# Patient Record
Sex: FEMALE | Race: WHITE | Employment: FULL TIME | URBAN - METROPOLITAN AREA
[De-identification: names, ages, dates, MRNs, and addresses within clinical notes are randomized per-mention and may not be internally consistent; named-entity substitution may affect disease eponyms.]

---

## 2019-03-04 ENCOUNTER — OFFICE VISIT (OUTPATIENT)
Dept: OBGYN | Facility: OTHER | Age: 19
End: 2019-03-04

## 2019-03-04 VITALS — SYSTOLIC BLOOD PRESSURE: 112 MMHG | WEIGHT: 151.5 LBS | DIASTOLIC BLOOD PRESSURE: 70 MMHG | HEART RATE: 80 BPM

## 2019-03-04 DIAGNOSIS — N94.2 VAGINISMUS: Primary | ICD-10-CM

## 2019-03-04 PROCEDURE — 99203 OFFICE O/P NEW LOW 30 MIN: CPT | Performed by: OBSTETRICS & GYNECOLOGY

## 2019-03-04 RX ORDER — LIDOCAINE HYDROCHLORIDE 40 MG/ML
SOLUTION TOPICAL PRN
Qty: 50 ML | Refills: 0 | Status: SHIPPED | OUTPATIENT
Start: 2019-03-04

## 2019-03-04 RX ORDER — LIDOCAINE HYDROCHLORIDE 40 MG/ML
SOLUTION TOPICAL DAILY
Qty: 50 ML | Refills: 0 | Status: SHIPPED | OUTPATIENT
Start: 2019-03-04 | End: 2019-03-04

## 2019-03-04 NOTE — PATIENT INSTRUCTIONS
If you have any questions regarding your visit, Please contact your care team.    Women [facility] CLINIC HOURS TELEPHONE NUMBER   Laquita Cabrera M.D.    Keira Garcias -         Monday-[facility]  7:00 am - 5 pm Monday's Tuesday- [facility]  8:00am- 5 pm  Wednesday- Off  Thursday- Offf Friday-Am [place], and [facility]  [place] 8:00-11:30 AM  [place] 1:00-5:00 PM [facility]  [address] [place] [address]  [phone] ask for [facility]    Imaging [phone]    [facility]  [address]  [place], [place] [address] [phone]  Imaging [phone]    [facility]  [address] [place], [place] [id number] [phone]  Imaging Scheduling - [phone]     Urgent Care locations:    [facility] Saturday and Sunday   9 am - 5 pm    Monday-Friday   12 pm - 8 pm  Saturday and Sunday   9 am - 5 pm   [phone]       If you need a medication refill, please contact your pharmacy. Please allow 3 business days for your refill to be completed.  As always, Thank you for trusting us with your healthcare needs!

## 2019-03-04 NOTE — PROGRESS NOTES
Subjective  18 year old non-pregnant female presents today complaining of vaginal pain.  Patient states she has tried to have intercourse but it is too painful.  Her partner has never been able to penetrate.  She has pain with fingers as well.  She has not been able to use tampons.  Her menses are regular, monthly.  Her last menstrual period was 2/17.  She changes her pad rarely.          ROS: 10 point ROS neg other than the symptoms noted above in the HPI.  History reviewed. No pertinent past medical history.  History reviewed. No pertinent surgical history.  History reviewed. No pertinent family history.  Social History     Tobacco Use     Smoking status: Never Smoker     Smokeless tobacco: Never Used   Substance Use Topics     Alcohol use: Yes         Objective  Vitals: /70   Pulse 80   Wt 68.7 kg (151 lb 8 oz)   LMP 02/17/2019   BMI= There is no height or weight on file to calculate BMI.    General appearance=well developed, well-nourished female  Psych=mood is stable  PELVIC:  Patient very uncomfortable with exam  External genitalia: normal without lesions or masses  Urethral meatus: no lesions or prolapse noted, normal size  Urethra: no masses, non tender  Bladder: non tender, no fullness  Vagina: normal mucosa and rugae, no discharge.  Cervix: unable to palpate due to pain  Uterus: unable to palpate due to pain  Adnexa: unable to palpate due to pain  Rectal: deffered      Assessment  1.)  Vaginismus      Plan  1.)  Physical therapy referral placed  2.)  Lidocaine ordered      25 minutes was spent face to face with the patient today discussing her history, diagnosis, and follow-up plan as noted above.  Over 50% of the visit was spent in counseling and coordination of care.      Nursing notes read and reviewed    Laquita Cabrera

## 2020-03-17 ENCOUNTER — TELEPHONE (OUTPATIENT)
Dept: FAMILY MEDICINE | Facility: CLINIC | Age: 20
End: 2020-03-17

## 2020-03-17 ENCOUNTER — VIRTUAL VISIT (OUTPATIENT)
Dept: FAMILY MEDICINE | Facility: CLINIC | Age: 20
End: 2020-03-17
Payer: COMMERCIAL

## 2020-03-17 DIAGNOSIS — F41.1 GAD (GENERALIZED ANXIETY DISORDER): Primary | ICD-10-CM

## 2020-03-17 PROCEDURE — 99203 OFFICE O/P NEW LOW 30 MIN: CPT | Mod: TEL | Performed by: FAMILY MEDICINE

## 2020-03-17 RX ORDER — LEVONORGESTREL/ETHIN.ESTRADIOL 0.1-0.02MG
1 TABLET ORAL DAILY
COMMUNITY

## 2020-03-17 RX ORDER — CLONAZEPAM 0.5 MG/1
0.5 TABLET, ORALLY DISINTEGRATING ORAL 2 TIMES DAILY PRN
Qty: 10 TABLET | Refills: 0 | Status: SHIPPED | OUTPATIENT
Start: 2020-03-17 | End: 2021-08-24

## 2020-03-17 RX ORDER — CLONAZEPAM 0.5 MG/1
0.5 TABLET, ORALLY DISINTEGRATING ORAL 2 TIMES DAILY PRN
Qty: 10 TABLET | Refills: 0 | Status: SHIPPED | OUTPATIENT
Start: 2020-03-17 | End: 2020-03-17

## 2020-03-17 NOTE — TELEPHONE ENCOUNTER
Please advise patient that those prescriptions were sent to [facility].  Please call [facility] and [facility] and cancel previous orders.    Pako Frazier MD, FAAFP  Family Medicine Physician  [facility]  [address] [place] [address]

## 2020-03-17 NOTE — TELEPHONE ENCOUNTER
Patient calling to request that the [facility] be resent to [facility] instead due to insurance.  Thanks

## 2020-03-17 NOTE — PROGRESS NOTES
"Sánchez Farias is a 19 year old female who is being evaluated via a billable telephone visit.      The patient has been notified of following:     \"This telephone visit will be conducted via a call between you and your physician/provider. We have found that certain health care needs can be provided without the need for a physical exam.  This service lets us provide the care you need with a short phone conversation.  If a prescription is necessary we can send it directly to your pharmacy.  If lab work is needed we can place an order for that and you can then stop by our lab to have the test done at a later time.    If during the course of the call the physician/provider feels a telephone visit is not appropriate, you will not be charged for this service.\"       Sánchez Farias complains of    Chief Complaint   Patient presents with     Anxiety       I have reviewed and updated the patient's Past Medical History, Social History, Family History and Medication List.    ALLERGIES  Gluten meal    Catia Romeo MA   (MA signature)    Additional provider notes: Anxiety for past several months, worsening since Nov 2019, improved during winter break.  Internship, [employer].  She has occasional panic attacks, stressful internship.  No suicidal or homicidal ideation.    History reviewed. No pertinent past medical history.      Assessment/Plan:  (F41.1) WAYNE (generalized anxiety disorder)  (primary encounter diagnosis)  Comment: We discussed current treatment options to include therapy and medication.  Patient would like medication at this time.  We discussed tapering up on Zoloft.  Limited use of clonazepam, only for panic attacks in the short-term.  Follow-up with me in 2 weeks.  Sooner if any worsening of mood.  Plan: sertraline (ZOLOFT) 50 MG tablet, clonazePAM         (KLONOPIN) 0.5 MG ODT            [facility] is currently attempting to limit face to face encounters to help reduce the spread of " COVID-19 within our community.  This information was presented to the patient. The patient's record was reviewed by a provider and the patient was offered a phone visit, rescheduled appointment after July 6, 2020, or if necessary, maintain the original face to face appointment, if applicable.  The patient opted for a telephone visit.      In an ideal situation, a face to face examination would offer more information regarding the patient's current condition.  However, given the current public health threat of COVID-19, a telephone visit offers a safer alternative to a face to face visit.  The patient was advised to follow up virtually or in clinic if no improvement or any worsening.      Pako Frazier MD, [facility]  Family Medicine Physician  [facility]  [id number] [place] [id number]          I have reviewed the note as documented above.  This accurately captures the substance of my conversation with the patient.      Phone call contact time  Call Started at 0906  Call Ended at 0920    Pako Frazier MD

## 2020-03-30 ENCOUNTER — VIRTUAL VISIT (OUTPATIENT)
Dept: FAMILY MEDICINE | Facility: CLINIC | Age: 20
End: 2020-03-30
Payer: COMMERCIAL

## 2020-03-30 DIAGNOSIS — F41.1 GAD (GENERALIZED ANXIETY DISORDER): Primary | ICD-10-CM

## 2020-03-30 PROCEDURE — 99213 OFFICE O/P EST LOW 20 MIN: CPT | Mod: TEL | Performed by: FAMILY MEDICINE

## 2020-03-30 ASSESSMENT — ANXIETY QUESTIONNAIRES
7. FEELING AFRAID AS IF SOMETHING AWFUL MIGHT HAPPEN: NOT AT ALL
3. WORRYING TOO MUCH ABOUT DIFFERENT THINGS: NOT AT ALL
6. BECOMING EASILY ANNOYED OR IRRITABLE: NOT AT ALL
5. BEING SO RESTLESS THAT IT IS HARD TO SIT STILL: NOT AT ALL
IF YOU CHECKED OFF ANY PROBLEMS ON THIS QUESTIONNAIRE, HOW DIFFICULT HAVE THESE PROBLEMS MADE IT FOR YOU TO DO YOUR WORK, TAKE CARE OF THINGS AT HOME, OR GET ALONG WITH OTHER PEOPLE: NOT DIFFICULT AT ALL
2. NOT BEING ABLE TO STOP OR CONTROL WORRYING: NEARLY EVERY DAY
GAD7 TOTAL SCORE: 6
1. FEELING NERVOUS, ANXIOUS, OR ON EDGE: MORE THAN HALF THE DAYS

## 2020-03-30 ASSESSMENT — PATIENT HEALTH QUESTIONNAIRE - PHQ9
5. POOR APPETITE OR OVEREATING: SEVERAL DAYS
SUM OF ALL RESPONSES TO PHQ QUESTIONS 1-9: 5

## 2020-03-30 NOTE — PROGRESS NOTES
"Subjective     Sánchez Farias is a 19 year old female who is being evaluated via a billable telephone visit.      The patient has been notified of following:     \"This telephone visit will be conducted via a call between you and your physician/provider. We have found that certain health care needs can be provided without the need for a physical exam.  This service lets us provide the care you need with a short phone conversation.  If a prescription is necessary we can send it directly to your pharmacy.  If lab work is needed we can place an order for that and you can then stop by our lab to have the test done at a later time.    If during the course of the call the physician/provider feels a telephone visit is not appropriate, you will not be charged for this service.\"     Physician has received verbal consent for a Telephone Visit from the patient? Yes    Sánchez Farias complains of   Chief Complaint   Patient presents with     Anxiety       ALLERGIES  Gluten meal    Along with generalized anxiety, was started on sertraline 2 weeks ago.  She has noticed a significant decrease in her anxiety, has only required 1 dose of Klonopin.  No adverse effects.    Patient Active Problem List   Diagnosis     Vaginismus     History reviewed. No pertinent surgical history.    Social History     Tobacco Use     Smoking status: Never Smoker     Smokeless tobacco: Never Used   Substance Use Topics     Alcohol use: Yes     History reviewed. No pertinent family history.      Current Outpatient Medications   Medication Sig Dispense Refill     clonazePAM (KLONOPIN) 0.5 MG ODT Take 1 tablet (0.5 mg) by mouth 2 times daily as needed for anxiety 10 tablet 0     IBUPROFEN PO        levonorgestrel-ethinyl estradiol (AVIANE) 0.1-20 MG-MCG tablet Take 1 tablet by mouth daily       sertraline (ZOLOFT) 50 MG tablet Take 1 tablet (50 mg) by mouth daily 90 tablet 0     sertraline (ZOLOFT) 50 MG tablet Take 0.5 tablets (25 mg) by mouth daily for " 3 days, THEN 1 tablet (50 mg) daily. 32 tablet 0     lidocaine (XYLOCAINE) 4 % external solution Apply topically as needed for moderate pain (Patient not taking: Reported on 3/17/2020) 50 mL 0     Allergies   Allergen Reactions     Gluten Meal      Chest tightness, shortness of breath       No lab results found.   BP Readings from Last 3 Encounters:   03/04/19 112/70    Wt Readings from Last 3 Encounters:   03/04/19 68.7 kg (151 lb 8 oz) (83 %)*   12/19/14 68.7 kg (151 lb 6.4 oz) (90 %)*   03/05/13 58.7 kg (129 lb 6.4 oz) (85 %)*     * Growth percentiles are based on CDC (Girls, 2-20 Years) data.                    Reviewed and updated as needed this visit by Provider         Review of Systems   ROS COMP: Constitutional, HEENT, cardiovascular, pulmonary, gi and gu systems are negative, except as otherwise noted.       Objective   Reported vitals:  There were no vitals taken for this visit.   alert and no distress  Psych: Alert and oriented times 3; coherent speech, normal   rate and volume, able to articulate logical thoughts, able   to abstract reason, no tangential thoughts, no hallucinations   or delusions  Her affect is normal.    Diagnostic Test Results:  Labs reviewed in Epic        Assessment/Plan:  1. WAYNE (generalized anxiety disorder)  Doing very well on sertraline after only 2 weeks.  Will continue and she will follow-up in 1 month, sooner if any worsening of mood.  - sertraline (ZOLOFT) 50 MG tablet; Take 1 tablet (50 mg) by mouth daily  Dispense: 90 tablet; Refill: 0    Return in about 4 weeks (around 4/27/2020) for Follow Up from Today's Encounter.      Phone call duration:  5 minutes    Pako Frazier MD

## 2020-03-31 ASSESSMENT — ANXIETY QUESTIONNAIRES: GAD7 TOTAL SCORE: 6

## 2020-08-25 DIAGNOSIS — F41.1 GAD (GENERALIZED ANXIETY DISORDER): ICD-10-CM

## 2020-12-29 DIAGNOSIS — F41.1 GAD (GENERALIZED ANXIETY DISORDER): ICD-10-CM

## 2020-12-30 NOTE — TELEPHONE ENCOUNTER
Prescription approved per [facility] Refill [facility].  Nabil Kendrick RN  December 30, 2020

## 2021-04-03 DIAGNOSIS — F41.1 GAD (GENERALIZED ANXIETY DISORDER): ICD-10-CM

## 2021-04-03 NOTE — LETTER
April 6, 2021      Sánchez Farias  [address] [address]              Dear Sánchez,    We just wanted to let you know that we are unable to refill your sertraline without seeing you for a visit first.     Please give us a call at [phone] or use Purple to schedule.     Have a great day.    Your [facility] Team

## 2021-04-05 NOTE — TELEPHONE ENCOUNTER
Pending Prescriptions:                       Disp   Refills    sertraline (ZOLOFT) 50 MG tablet [Pharmacy*90 tab*0        Sig: TAKE 1 TABLET(50 MG) BY MOUTH DAILY      Routing refill request to provider for review/approval because:  Michaelle given x1 and patient did not follow up, please advise    Andreea Femrin, RN on 4/5/2021 at 12:55 PM

## 2021-04-05 NOTE — TELEPHONE ENCOUNTER
Mailbox is full so can not leave a message. Will try again later to reach out to schedule appt.    Jackie Degroot CMA ([facility])

## 2021-08-24 ENCOUNTER — TELEPHONE (OUTPATIENT)
Dept: FAMILY MEDICINE | Facility: CLINIC | Age: 21
End: 2021-08-24

## 2021-08-24 ENCOUNTER — VIRTUAL VISIT (OUTPATIENT)
Dept: FAMILY MEDICINE | Facility: CLINIC | Age: 21
End: 2021-08-24
Payer: COMMERCIAL

## 2021-08-24 DIAGNOSIS — F41.1 GAD (GENERALIZED ANXIETY DISORDER): ICD-10-CM

## 2021-08-24 PROCEDURE — 99213 OFFICE O/P EST LOW 20 MIN: CPT | Mod: 95 | Performed by: FAMILY MEDICINE

## 2021-08-24 RX ORDER — SERTRALINE HYDROCHLORIDE 100 MG/1
100 TABLET, FILM COATED ORAL DAILY
Qty: 90 TABLET | Refills: 3 | Status: SHIPPED | OUTPATIENT
Start: 2021-08-24

## 2021-08-24 RX ORDER — CLONAZEPAM 0.5 MG/1
0.5 TABLET, ORALLY DISINTEGRATING ORAL 2 TIMES DAILY PRN
Qty: 10 TABLET | Refills: 0 | Status: SHIPPED | OUTPATIENT
Start: 2021-08-24

## 2021-08-24 ASSESSMENT — ANXIETY QUESTIONNAIRES
2. NOT BEING ABLE TO STOP OR CONTROL WORRYING: SEVERAL DAYS
GAD7 TOTAL SCORE: 7
1. FEELING NERVOUS, ANXIOUS, OR ON EDGE: MORE THAN HALF THE DAYS
7. FEELING AFRAID AS IF SOMETHING AWFUL MIGHT HAPPEN: SEVERAL DAYS
5. BEING SO RESTLESS THAT IT IS HARD TO SIT STILL: NOT AT ALL
6. BECOMING EASILY ANNOYED OR IRRITABLE: SEVERAL DAYS
3. WORRYING TOO MUCH ABOUT DIFFERENT THINGS: SEVERAL DAYS
IF YOU CHECKED OFF ANY PROBLEMS ON THIS QUESTIONNAIRE, HOW DIFFICULT HAVE THESE PROBLEMS MADE IT FOR YOU TO DO YOUR WORK, TAKE CARE OF THINGS AT HOME, OR GET ALONG WITH OTHER PEOPLE: SOMEWHAT DIFFICULT

## 2021-08-24 ASSESSMENT — PATIENT HEALTH QUESTIONNAIRE - PHQ9
5. POOR APPETITE OR OVEREATING: SEVERAL DAYS
SUM OF ALL RESPONSES TO PHQ QUESTIONS 1-9: 9

## 2021-08-24 NOTE — TELEPHONE ENCOUNTER
Per virtual visit check out:    Visit Disposition    Dispositions Check-out Note   0 Return in about 4 weeks (around 9/21/2021) for Annual Well Check. Schedule annual visit     Jackie Degroot CMA ([facility])

## 2021-08-24 NOTE — PROGRESS NOTES
Sánchez is a 20 year old who is being evaluated via a billable telephone visit.      What phone number would you like to be contacted at? [phone]  How would you like to obtain your AVS? Mail a copy    Assessment & Plan     WAYNE (generalized anxiety disorder)  20-year-old female with history of generalized anxiety disorder.  Had good improvement with sertraline 50 mg.  She did have some occasional breakthrough anxiety.  Feels safe without suicidal or homicidal ideation.  We will increase to 100 mg daily, patient understands goal is to maximize sertraline, to the point where she does not require clonazepam.  Currently only requires that occasionally.  - sertraline (ZOLOFT) 100 MG tablet; Take 1 tablet (100 mg) by mouth daily  - clonazePAM (KLONOPIN) 0.5 MG ODT; Take 1 tablet (0.5 mg) by mouth 2 times daily as needed for anxiety      Return in about 4 weeks (around 9/21/2021) for Annual Well Check.    Pako Frazier MD  [facility] KIET Pozo is a 20 year old who presents for the following health issues     HPI     Depression and Anxiety Follow-Up    How are you doing with your depression since your last visit? Improved but still feels seasonal depression    How are you doing with your anxiety since your last visit?  No change    Are you having other symptoms that might be associated with depression or anxiety? No    Have you had a significant life event? No     Do you have any concerns with your use of alcohol or other drugs? No    Social History     Tobacco Use     Smoking status: Never Smoker     Smokeless tobacco: Never Used   Vaping Use     Vaping Use: Never used   Substance Use Topics     Alcohol use: Yes     Drug use: Yes     Types: Marijuana     PHQ 3/30/2020   PHQ-9 Total Score 5   Q9: Thoughts of better off dead/self-harm past 2 weeks Not at all     WAYNE-7 SCORE 3/30/2020   Total Score 6     Last PHQ-9 8/24/2021   1.  Little interest or pleasure in doing things 1   2.   Feeling down, depressed, or hopeless 1   3.  Trouble falling or staying asleep, or sleeping too much 2   4.  Feeling tired or having little energy 2   5.  Poor appetite or overeating 1   6.  Feeling bad about yourself 0   7.  Trouble concentrating 1   8.  Moving slowly or restless 1   Q9: Thoughts of better off dead/self-harm past 2 weeks 0   PHQ-9 Total Score 9   Difficulty at work, home, or with people Not difficult at all     WAYNE-7  8/24/2021   1. Feeling nervous, anxious, or on edge 2   2. Not being able to stop or control worrying 1   3. Worrying too much about different things 1   4. Trouble relaxing 1   5. Being so restless that it is hard to sit still 0   6. Becoming easily annoyed or irritable 1   7. Feeling afraid, as if something awful might happen 1   WAYNE-7 Total Score 7   If you checked any problems, how difficult have they made it for you to do your work, take care of things at home, or get along with other people? Somewhat difficult           Review of Systems   Constitutional, HEENT, cardiovascular, pulmonary, gi and gu systems are negative, except as otherwise noted.      Objective           Vitals:  No vitals were obtained today due to virtual visit.    Physical Exam   healthy, alert and no distress  PSYCH: Alert and oriented times 3; coherent speech, normal   rate and volume, able to articulate logical thoughts, able   to abstract reason, no tangential thoughts, no hallucinations   or delusions  Her affect is normal  RESP: No cough, no audible wheezing, able to talk in full sentences  Remainder of exam unable to be completed due to telephone visits                Phone call duration: 5 minutes

## 2021-08-25 ASSESSMENT — ANXIETY QUESTIONNAIRES: GAD7 TOTAL SCORE: 7

## 2022-07-02 ENCOUNTER — HEALTH MAINTENANCE LETTER (OUTPATIENT)
Age: 22
End: 2022-07-02

## 2023-04-22 ENCOUNTER — HEALTH MAINTENANCE LETTER (OUTPATIENT)
Age: 23
End: 2023-04-22

## 2023-07-15 ENCOUNTER — HEALTH MAINTENANCE LETTER (OUTPATIENT)
Age: 23
End: 2023-07-15

## 2024-09-07 ENCOUNTER — HEALTH MAINTENANCE LETTER (OUTPATIENT)
Age: 24
End: 2024-09-07

## 2024-12-31 ENCOUNTER — OFFICE VISIT (OUTPATIENT)
Dept: OBGYN | Facility: CLINIC | Age: 24
End: 2024-12-31
Attending: OBSTETRICS & GYNECOLOGY
Payer: COMMERCIAL

## 2024-12-31 ENCOUNTER — ANCILLARY PROCEDURE (OUTPATIENT)
Dept: ULTRASOUND IMAGING | Facility: CLINIC | Age: 24
End: 2024-12-31
Attending: OBSTETRICS & GYNECOLOGY
Payer: COMMERCIAL

## 2024-12-31 VITALS
DIASTOLIC BLOOD PRESSURE: 87 MMHG | SYSTOLIC BLOOD PRESSURE: 136 MMHG | HEIGHT: 68 IN | HEART RATE: 85 BPM | BODY MASS INDEX: 27.07 KG/M2 | WEIGHT: 178.6 LBS

## 2024-12-31 DIAGNOSIS — Z11.3 SCREENING EXAMINATION FOR STI: ICD-10-CM

## 2024-12-31 DIAGNOSIS — Z12.4 SCREENING FOR MALIGNANT NEOPLASM OF CERVIX: ICD-10-CM

## 2024-12-31 DIAGNOSIS — N93.9 ABNORMAL UTERINE BLEEDING (AUB): Primary | ICD-10-CM

## 2024-12-31 DIAGNOSIS — N93.9 ABNORMAL UTERINE BLEEDING (AUB): ICD-10-CM

## 2024-12-31 PROCEDURE — 99213 OFFICE O/P EST LOW 20 MIN: CPT | Performed by: OBSTETRICS & GYNECOLOGY

## 2024-12-31 PROCEDURE — 76856 US EXAM PELVIC COMPLETE: CPT | Mod: 26 | Performed by: STUDENT IN AN ORGANIZED HEALTH CARE EDUCATION/TRAINING PROGRAM

## 2024-12-31 PROCEDURE — 87491 CHLMYD TRACH DNA AMP PROBE: CPT | Performed by: OBSTETRICS & GYNECOLOGY

## 2024-12-31 PROCEDURE — 76856 US EXAM PELVIC COMPLETE: CPT

## 2024-12-31 RX ORDER — TRANEXAMIC ACID 650 MG/1
1300 TABLET ORAL 3 TIMES DAILY
Qty: 30 TABLET | Refills: 0 | Status: SHIPPED | OUTPATIENT
Start: 2024-12-31

## 2024-12-31 RX ORDER — TRETINOIN 0.5 MG/G
CREAM TOPICAL
COMMUNITY
Start: 2024-05-01

## 2024-12-31 RX ORDER — SODIUM SULFACETAMIDE AND SULFUR 80; 40 MG/ML; MG/ML
SOLUTION TOPICAL
COMMUNITY
Start: 2024-07-11

## 2024-12-31 NOTE — PROGRESS NOTES
"[facility]    CC: Prolonged period    S: 24 year old here for evaluation of abnormal period. Menses are usually every 4 weeks, lasting 7 days. No bleeding between periods. This period started on 12/12, was normal for first 7 days, then spotting for 4 days, now heavy again with clots. No pain or cramping. Has increased stress with work and holidays, no other health changes. Weight is stable, no new heat/cold intolerance, skin/acne changes or bowel changes. Is sexually active and using condoms for contraception. Considering nexplanon but wants to do more research. Has history of vaginismus, difficulty with exams but able to have vaginal intercourse without issues.     History reviewed. No pertinent past medical history.  History reviewed. No pertinent surgical history.  Family History   Problem Relation Age of Onset    Colon Cancer Other    Meds: None  All: NKDA    O: /87   Pulse 85   Ht 1.734 m (5' 8.25\")   Wt 81 kg (178 lb 9.6 oz)   LMP 12/12/2024 (Exact Date)   BMI 26.96 kg/m    General: No distress  Abdomen: Soft, non-tender, non-distended, no masses  Pelvic Exam:  Vulva: No external lesions, normal hair distribution, normal architecture  Vagina: Moist, pink, blood in vaginal vault, vaginal muscle spasm noted with speculum exam  Cervix: smooth, pink, no visible lesions  Uterus: Normal size, anteverted, non-tender, mobile  Adnexa: Non-tender, no masses    A:24 year old with AUB    P: Reviewed options for management of current bleeding, will do course of TXA  Discussed contraception options, considering IUD under anesthesia vs nexplanon  TSH, CBC, hcg today  Pelvic US  Mychart with results      Alejandra Mabry MD FACOG  "

## 2024-12-31 NOTE — PATIENT INSTRUCTIONS
Take lysteda- 3 times per day up to 5 days to help with bleeding    Schedule ultrasound    Consider contraception- if want nexplanon, schedule clinic visit. If IUD- [employer] message and will help  schedule under anesthesia        Thank you for trusting us with your care!   Please be aware, if you are on Mychart, you may see your results prior to your providers review. If labs are abnormal, we will call or message you on [employer] with a follow up plan.    If you need to contact us for questions about:  Symptoms, Scheduling & Medical Questions; Non-urgent (2-3 day response) [employer] message, Urgent (needing response today) [phone] (if after 3:30pm next day response)   Prescriptions: Please call your Pharmacy   Billing: Kacie [phone] or [facility]:[phone]

## 2024-12-31 NOTE — LETTER
"12/31/2024       RE: Sánchez Farias  [address] [address]     Dear Colleague,    Thank you for referring your patient, Sánchez Farias, to the [facility] [place] [facility]. Please see a copy of my visit note below.    [facility]    CC: Prolonged period    S: 24 year old here for evaluation of abnormal period. Menses are usually every 4 weeks, lasting 7 days. No bleeding between periods. This period started on 12/12, was normal for first 7 days, then spotting for 4 days, now heavy again with clots. No pain or cramping. Has increased stress with work and holidays, no other health changes. Weight is stable, no new heat/cold intolerance, skin/acne changes or bowel changes. Is sexually active and using condoms for contraception. Considering nexplanon but wants to do more research. Has history of vaginismus, difficulty with exams but able to have vaginal intercourse without issues.     History reviewed. No pertinent past medical history.  History reviewed. No pertinent surgical history.  Family History   Problem Relation Age of Onset     Colon Cancer Other    Meds: None  All: NKDA    O: /87   Pulse 85   Ht 1.734 m (5' 8.25\")   Wt 81 kg (178 lb 9.6 oz)   LMP 12/12/2024 (Exact Date)   BMI 26.96 kg/m    General: No distress  Abdomen: Soft, non-tender, non-distended, no masses  Pelvic Exam:  Vulva: No external lesions, normal hair distribution, normal architecture  Vagina: Moist, pink, blood in vaginal vault, vaginal muscle spasm noted with speculum exam  Cervix: smooth, pink, no visible lesions  Uterus: Normal size, anteverted, non-tender, mobile  Adnexa: Non-tender, no masses    A:24 year old with AUB    P: Reviewed options for management of current bleeding, will do course of TXA  Discussed contraception options, considering IUD under anesthesia vs nexplanon  TSH, CBC, hcg " today  Pelvic US  Mychart with results      Alejandra Mabry MD FACOG      Again, thank you for allowing me to participate in the care of your patient.      Sincerely,    Alejandra Mabry MD

## 2025-01-01 LAB
C TRACH DNA SPEC QL PROBE+SIG AMP: NEGATIVE
N GONORRHOEA DNA SPEC QL NAA+PROBE: NEGATIVE

## 2025-01-07 LAB
BKR LAB AP GYN ADEQUACY: NORMAL
BKR LAB AP GYN INTERPRETATION: NORMAL
BKR LAB AP HPV REFLEX: NORMAL
BKR LAB AP LMP: NORMAL
BKR LAB AP PREVIOUS ABNORMAL: NORMAL
PATH REPORT.COMMENTS IMP SPEC: NORMAL
PATH REPORT.COMMENTS IMP SPEC: NORMAL
PATH REPORT.RELEVANT HX SPEC: NORMAL